# Patient Record
Sex: FEMALE | Race: WHITE | NOT HISPANIC OR LATINO | Employment: FULL TIME | URBAN - METROPOLITAN AREA
[De-identification: names, ages, dates, MRNs, and addresses within clinical notes are randomized per-mention and may not be internally consistent; named-entity substitution may affect disease eponyms.]

---

## 2022-12-15 ENCOUNTER — OFFICE VISIT (OUTPATIENT)
Dept: PEDIATRICS | Facility: CLINIC | Age: 27
End: 2022-12-15
Attending: FAMILY MEDICINE
Payer: COMMERCIAL

## 2022-12-15 ENCOUNTER — ANCILLARY PROCEDURE (OUTPATIENT)
Dept: ULTRASOUND IMAGING | Facility: CLINIC | Age: 27
End: 2022-12-15
Attending: EMERGENCY MEDICINE
Payer: COMMERCIAL

## 2022-12-15 ENCOUNTER — OFFICE VISIT (OUTPATIENT)
Dept: URGENT CARE | Facility: URGENT CARE | Age: 27
End: 2022-12-15
Payer: COMMERCIAL

## 2022-12-15 VITALS
SYSTOLIC BLOOD PRESSURE: 154 MMHG | OXYGEN SATURATION: 99 % | WEIGHT: 213 LBS | RESPIRATION RATE: 16 BRPM | TEMPERATURE: 99.1 F | DIASTOLIC BLOOD PRESSURE: 97 MMHG | HEART RATE: 110 BPM

## 2022-12-15 VITALS
HEART RATE: 103 BPM | RESPIRATION RATE: 16 BRPM | TEMPERATURE: 98.1 F | DIASTOLIC BLOOD PRESSURE: 88 MMHG | OXYGEN SATURATION: 97 % | SYSTOLIC BLOOD PRESSURE: 144 MMHG

## 2022-12-15 DIAGNOSIS — R51.9 ACUTE NONINTRACTABLE HEADACHE, UNSPECIFIED HEADACHE TYPE: ICD-10-CM

## 2022-12-15 DIAGNOSIS — M79.669 PAIN OF LOWER LEG, UNSPECIFIED LATERALITY: Primary | ICD-10-CM

## 2022-12-15 DIAGNOSIS — M79.661 PAIN IN BOTH LOWER LEGS: Primary | ICD-10-CM

## 2022-12-15 DIAGNOSIS — M79.10 MYALGIA: ICD-10-CM

## 2022-12-15 DIAGNOSIS — M79.662 PAIN IN BOTH LOWER LEGS: Primary | ICD-10-CM

## 2022-12-15 DIAGNOSIS — R03.0 ELEVATED BLOOD PRESSURE READING WITHOUT DIAGNOSIS OF HYPERTENSION: ICD-10-CM

## 2022-12-15 LAB
FLUAV AG SPEC QL IA: NEGATIVE
FLUBV AG SPEC QL IA: NEGATIVE
SARS-COV-2 RNA RESP QL NAA+PROBE: NEGATIVE

## 2022-12-15 PROCEDURE — U0005 INFEC AGEN DETEC AMPLI PROBE: HCPCS | Performed by: EMERGENCY MEDICINE

## 2022-12-15 PROCEDURE — 87804 INFLUENZA ASSAY W/OPTIC: CPT | Performed by: EMERGENCY MEDICINE

## 2022-12-15 PROCEDURE — U0003 INFECTIOUS AGENT DETECTION BY NUCLEIC ACID (DNA OR RNA); SEVERE ACUTE RESPIRATORY SYNDROME CORONAVIRUS 2 (SARS-COV-2) (CORONAVIRUS DISEASE [COVID-19]), AMPLIFIED PROBE TECHNIQUE, MAKING USE OF HIGH THROUGHPUT TECHNOLOGIES AS DESCRIBED BY CMS-2020-01-R: HCPCS | Performed by: EMERGENCY MEDICINE

## 2022-12-15 PROCEDURE — 99204 OFFICE O/P NEW MOD 45 MIN: CPT | Performed by: EMERGENCY MEDICINE

## 2022-12-15 PROCEDURE — 99207 REFERRAL TO ACUTE AND DIAGNOSTIC SERVICES: CPT | Performed by: FAMILY MEDICINE

## 2022-12-15 PROCEDURE — 93970 EXTREMITY STUDY: CPT | Mod: GC | Performed by: RADIOLOGY

## 2022-12-15 RX ORDER — ALBUTEROL SULFATE 90 UG/1
2 AEROSOL, METERED RESPIRATORY (INHALATION) EVERY 4 HOURS PRN
COMMUNITY
Start: 2022-10-12

## 2022-12-15 NOTE — PROGRESS NOTES
Chief complaint: leg swelling and pain    Patient for the past week noted pain mainly in the left calf swelling and tenderness  The past 2 days on the right calf as well   No chest pain or shortness of breath  Occasional right upper back pain worse with movement worse yesterday but better today      No Known Allergies    No past medical history on file.    albuterol (PROAIR HFA/PROVENTIL HFA/VENTOLIN HFA) 108 (90 Base) MCG/ACT inhaler, Inhale 2 puffs into the lungs every 4 hours as needed    No current facility-administered medications on file prior to visit.           ROS:  review of systems negative except for noted above.       OBJECTIVE:  BP (!) 154/97   Pulse 110   Temp 99.1  F (37.3  C) (Tympanic)   Resp 16   Wt 96.6 kg (213 lb)   SpO2 99%    General:   awake, alert, and cooperative.  NAD.   Head: Normocephalic, atraumatic.  Eyes: Conjunctiva clear  Neuro: Alert and oriented - normal speech.  MS: Using extremities freely  Both lower extremity swelling on both calf and some redness   PSYCH:  Normal affect, normal speech  SKIN: no obvious rashes    ASSESSMENT:    ICD-10-CM    1. Pain of lower leg, unspecified laterality  M79.669 Referral to Acute and Diagnostic Services (Day of diagnostic / First order acute)          PLAN:   Referred to [facility] for rule out DVT  Discussed with [facility] provider      Advised about symptoms which might herald more serious problems.        Haydee Iraheta MD

## 2022-12-15 NOTE — PROGRESS NOTES
Assessment & Plan     Diagnosis:  (M79.661,  M79.662) Pain in both lower legs  (primary encounter diagnosis)    (M79.10) Myalgia    (R51.9) Acute nonintractable headache, unspecified headache type    (R03.0) Elevated blood pressure reading without diagnosis of hypertension      Medical Decision Making:  Libby Velázquez is a 27 year old female who presents for evaluation of leg/calf pain and slight swelling bilaterally.  A broad differential was considered including Baker's cyst rupture, Baker's cyst, hematoma, rupture, compartment syndrome, muscle rupture, DVT, superficial thrombophlebitis, compression of the venous structures higher up in the abdomen and or leg cellulitis/ abscess, etc. Ultrasound is negative for DVT. She does have history of superficial thrombophlebitis many years ago, has never required anticoagulation aside from aspirin.    Morewover, she does note she has had a bit of a headache, myalgias and cough as well over the last couple of days, suspect viral etiology.  Rapid influenza testing is negative here.  COVID-19 PCR is pending at this time.    There are no signs of compartment syndrome or other worrisome etiologies at this point so outpatient management is indicated.  I doubt more central causes of their swelling like renal failure, chf, liver disease, etc. They will elevate leg, do gentle dorsal flexion and plantar flexion motions, take Tylenol for pain, and avoid strenuous activity. They should followup with her primary care doctor.      Patient voices understanding and agreement with the plan including reasons to go to the ER immediately as well as to be seen by a more consistent care-giver, such as their PCP, if the symptoms persist more than 3 days.     40 minutes spent on the date of the encounter doing chart review, review of outside records, review of test results, interpretation of tests, patient visit and documentation       Jaiden Jones PA-C  [facility]  CARE    Subjective     Libby Velázquez is a 27 year old female who presents to clinic today for the following health issues:  Chief Complaint   Patient presents with     Musculoskeletal Problem     Bilateral leg pain       HPI    HPI Patient has been experiencing pain bilaterally in her legs. She states the pain in her left calf seems to be worse than her right. She reports swelling and tenderness.     She has a history of blood clots in her left armpit.     Patient is also reporting right upper back pain, headache, slight cough and     Pain History:  When did you first notice your pain? - Acute Pain   Have you seen anyone else for your pain? Yes - Urgent Care  Where in your body do you have pain? Musculoskeletal problem/pain  Onset/Duration: 1 week, worse in the past 2 days   Description  Location: leg - bilateral  Joint Swelling: YES  Redness: YES  Pain: YES , pain when walking as well  Warmth: YES  Intensity:  mild  Progression of Symptoms:  same  Accompanying signs and symptoms:   Fevers: No  Numbness/tingling/weakness: No  History  Trauma to the area: No  Recent illness:  No  Previous similar problem: YES, blood clot in left armpit when she was a sophomore in highschool   Previous evaluation:  No  Precipitating or alleviating factors:  Aggravating factors include: none  Therapies tried and outcome: nothing    Review of Systems    See HPI    Objective      Vitals: BP (!) 144/88 (BP Location: Left arm)   Pulse 103   Temp 98.1  F (36.7  C) (Oral)   Resp 16   LMP 12/15/2022   SpO2 97%     Vital signs reviewed by: Jaiden Jones PA-C    Physical Exam   Constitutional: Patient is alert and cooperative. No acute distress.  Mouth: Mucous membranes are moist. Normal tongue and tonsil. Posterior oropharynx is clear.  Eyes: Conjunctivae, EOMI and lids are normal. PERRL.  Cardiovascular: Regular rate and rhythm  Pulmonary/Chest: Lungs are clear to auscultation throughout. Effort normal. No respiratory distress. No  wheezes, rales or rhonchi.  GI: Abdomen is soft and non-tender throughout. No CVA tenderness bilaterally.  Neurological: Alert and oriented x3. CN 3-7 and 9-12 intact. Strength and sensation are intact and symmetric in the bilateral upper and lower extremities.   MSK: No pitting edema.  No erythema, warmth, varicosities or tenderness to palpation throughout.  No pulsatile popliteal mass.  Distal CMS intact.  Range of motion is normal at the hips, knees and ankles. No lymphangitis.  Skin: No rash noted on visualized skin.  Psychiatric:The patient has a normal mood and affect.     Labs/Imaging:  Results for orders placed or performed in visit on 12/15/22   US Lower Extremity Venous Duplex Bilateral     Status: None    Narrative    EXAMINATION: DOPPLER VENOUS ULTRASOUND OF BILATERAL LOWER EXTREMITIES,  12/15/2022 4:52 PM     COMPARISON: None.    HISTORY: Pain in both lower legs    TECHNIQUE:  Gray-scale evaluation with compression, spectral flow and  color Doppler assessment of the deep venous system of both legs from  groin to knee, and then at the ankles.    FINDINGS:  In both lower extremities, the common femoral, femoral, popliteal and  posterior tibial veins demonstrate normal compressibility and blood  flow.      Impression    IMPRESSION:  No evidence of deep venous thrombosis in either lower extremity.    I have personally reviewed the examination and initial interpretation  and I agree with the findings.    SRINIVASA MURO MD         SYSTEM ID:  [id number]                             Influenza A & B Antigen - Clinic Collect     Status: Normal    Specimen: Nose; Swab   Result Value Ref Range    Influenza A antigen Negative Negative    Influenza B antigen Negative Negative    Narrative    Test results must be correlated with clinical data. If necessary, results should be confirmed by a molecular assay or viral culture.       COVID-19 PCR: Pending      Jaiden Jones PA-C, December 15, 2022